# Patient Record
Sex: FEMALE | URBAN - METROPOLITAN AREA
[De-identification: names, ages, dates, MRNs, and addresses within clinical notes are randomized per-mention and may not be internally consistent; named-entity substitution may affect disease eponyms.]

---

## 2020-12-15 ENCOUNTER — APPOINTMENT (RX ONLY)
Dept: URBAN - METROPOLITAN AREA CLINIC 303 | Facility: CLINIC | Age: 45
Setting detail: DERMATOLOGY
End: 2020-12-15

## 2020-12-15 VITALS — TEMPERATURE: 97.4 F

## 2020-12-15 DIAGNOSIS — Z41.9 ENCOUNTER FOR PROCEDURE FOR PURPOSES OTHER THAN REMEDYING HEALTH STATE, UNSPECIFIED: ICD-10-CM

## 2020-12-15 PROCEDURE — ? OTHER

## 2020-12-15 PROCEDURE — ? BOTOX

## 2020-12-15 ASSESSMENT — LOCATION SIMPLE DESCRIPTION DERM
LOCATION SIMPLE: LEFT FOREHEAD
LOCATION SIMPLE: LEFT EYEBROW
LOCATION SIMPLE: GLABELLA
LOCATION SIMPLE: RIGHT FOREHEAD

## 2020-12-15 ASSESSMENT — LOCATION DETAILED DESCRIPTION DERM
LOCATION DETAILED: RIGHT INFERIOR FOREHEAD
LOCATION DETAILED: LEFT INFERIOR FOREHEAD
LOCATION DETAILED: GLABELLA
LOCATION DETAILED: LEFT MEDIAL EYEBROW
LOCATION DETAILED: RIGHT INFERIOR MEDIAL FOREHEAD

## 2020-12-15 ASSESSMENT — LOCATION ZONE DERM: LOCATION ZONE: FACE

## 2020-12-15 NOTE — PROCEDURE: OTHER
Other (Free Text): Pt had Botox injections one month ago in [place] done on her foreahead and periocular areas only. She thinks she spent about $250. She did not have any injections in the glabellar region so it freely moves and her forehead is not completely flat and slightly moving asymmetrically. I discussed she would benefit from Botox into the glabellar area. She wants to be conservative with cost so we will start out with only 10 units in this area but she may need more. Given already had botox on forehead I am agreeable to try a lower number of units. Follow up to add if needed. Also she asked about the “sinking look” under her eyes. I discussed with her it’s not appropriate to use Botox on the medial lower eyelid area where she has lost fat pads and should consider fillers for filling mid face. REfer to LUDIN Other (Free Text): Pt had Botox injections one month ago in [place] done on her foreahead and periocular areas only. She thinks she spent about $250. She did not have any injections in the glabellar region so it freely moves and her forehead is not completely flat and slightly moving asymmetrically. I discussed she would benefit from Botox into the glabellar area. She wants to be conservative with cost so we will start out with only 10 units in this area but she may need more. Given already had botox on forehead I am agreeable to try a lower number of units. Follow up to add if needed. Also she asked about the “sinking look” under her eyes. I discussed with her it’s not appropriate to use Botox on the medial lower eyelid area where she has lost fat pads and should consider fillers for filling mid face. REfer to LUDIN

## 2020-12-15 NOTE — PROCEDURE: BOTOX
Right Pupillary Line Units: 0
Expiration Date (Month Year): 09/2023
Show Additional Area 2: Yes
Show Mentalis Units: No
Detail Level: Detailed
Price (Use Numbers Only, No Special Characters Or $): 100
Dilution (U/0.1 Cc): 4
Post-Care Instructions: Patient instructed to not lie down for 4 hours and limit physical activity for 24 hours.
Consent: Written consent obtained. Risks include but not limited to lid/brow ptosis, bruising, swelling, diplopia, temporary effect, incomplete chemical denervation.
Reconstitution Date (Optional): 12/8/2020
Glabellar Complex Units: 10

## 2021-01-19 ENCOUNTER — APPOINTMENT (RX ONLY)
Dept: URBAN - METROPOLITAN AREA CLINIC 303 | Facility: CLINIC | Age: 46
Setting detail: DERMATOLOGY
End: 2021-01-19

## 2021-01-19 VITALS — TEMPERATURE: 96.9 F

## 2021-01-19 DIAGNOSIS — Z41.9 ENCOUNTER FOR PROCEDURE FOR PURPOSES OTHER THAN REMEDYING HEALTH STATE, UNSPECIFIED: ICD-10-CM

## 2021-01-19 PROCEDURE — ? BOTOX

## 2021-01-19 PROCEDURE — ? OTHER

## 2021-01-19 ASSESSMENT — LOCATION SIMPLE DESCRIPTION DERM
LOCATION SIMPLE: LEFT FOREHEAD
LOCATION SIMPLE: RIGHT FOREHEAD

## 2021-01-19 ASSESSMENT — LOCATION DETAILED DESCRIPTION DERM
LOCATION DETAILED: LEFT FOREHEAD
LOCATION DETAILED: RIGHT LATERAL FOREHEAD
LOCATION DETAILED: RIGHT SUPERIOR FOREHEAD
LOCATION DETAILED: RIGHT FOREHEAD
LOCATION DETAILED: LEFT SUPERIOR FOREHEAD

## 2021-01-19 ASSESSMENT — LOCATION ZONE DERM: LOCATION ZONE: FACE

## 2021-01-19 NOTE — PROCEDURE: BOTOX
Right Pupillary Line Units: 0
Expiration Date (Month Year): 09/2023
Show Additional Area 2: Yes
Show Mentalis Units: No
Detail Level: Detailed
Price (Use Numbers Only, No Special Characters Or $): 90
Dilution (U/0.1 Cc): 4
Post-Care Instructions: Patient instructed to not lie down for 4 hours and limit physical activity for 24 hours.
Consent: Written consent obtained. Risks include but not limited to lid/brow ptosis, bruising, swelling, diplopia, temporary effect, incomplete chemical denervation.
Forehead Units: 10
Reconstitution Date (Optional): 12/8/2020

## 2021-01-19 NOTE — PROCEDURE: OTHER
Note Text (......Xxx Chief Complaint.): This diagnosis correlates with the
Render Risk Assessment In Note?: no
Detail Level: Detailed
Other (Free Text): Pt on promotional in building rate per manager

## 2021-02-09 ENCOUNTER — APPOINTMENT (RX ONLY)
Dept: URBAN - METROPOLITAN AREA CLINIC 303 | Facility: CLINIC | Age: 46
Setting detail: DERMATOLOGY
End: 2021-02-09

## 2021-02-09 VITALS — TEMPERATURE: 97 F

## 2021-02-09 DIAGNOSIS — Z41.9 ENCOUNTER FOR PROCEDURE FOR PURPOSES OTHER THAN REMEDYING HEALTH STATE, UNSPECIFIED: ICD-10-CM

## 2021-02-09 PROCEDURE — ? BOTOX

## 2021-02-09 PROCEDURE — ? OTHER

## 2021-02-09 PROCEDURE — ? FULL BODY SKIN EXAM - DECLINED

## 2021-02-09 ASSESSMENT — LOCATION SIMPLE DESCRIPTION DERM
LOCATION SIMPLE: RIGHT EYEBROW
LOCATION SIMPLE: LEFT CHEEK
LOCATION SIMPLE: LEFT EYELID
LOCATION SIMPLE: LEFT FOREHEAD
LOCATION SIMPLE: GLABELLA
LOCATION SIMPLE: RIGHT TEMPLE
LOCATION SIMPLE: RIGHT CHEEK

## 2021-02-09 ASSESSMENT — LOCATION DETAILED DESCRIPTION DERM
LOCATION DETAILED: GLABELLA
LOCATION DETAILED: LEFT SUPERIOR CENTRAL MALAR CHEEK
LOCATION DETAILED: LEFT INFERIOR MEDIAL FOREHEAD
LOCATION DETAILED: RIGHT SUPERIOR LATERAL MALAR CHEEK
LOCATION DETAILED: LEFT LATERAL CANTHUS
LOCATION DETAILED: RIGHT CENTRAL EYEBROW
LOCATION DETAILED: RIGHT INFERIOR TEMPLE

## 2021-02-09 ASSESSMENT — LOCATION ZONE DERM
LOCATION ZONE: FACE
LOCATION ZONE: EYELID

## 2021-02-09 NOTE — PROCEDURE: OTHER
Detail Level: Detailed
Render Risk Assessment In Note?: no
Note Text (......Xxx Chief Complaint.): This diagnosis correlates with the
Other (Free Text): Jessie wants to do minimal tmt today on glabella and periorbital. Trying to keep cost to under 120 today. So we did 2 units on each site and may need to add more in 1-2 weeks

## 2021-02-22 ENCOUNTER — APPOINTMENT (RX ONLY)
Dept: URBAN - METROPOLITAN AREA CLINIC 303 | Facility: CLINIC | Age: 46
Setting detail: DERMATOLOGY
End: 2021-02-22

## 2021-02-22 VITALS — TEMPERATURE: 97.2 F

## 2021-02-22 DIAGNOSIS — Z41.9 ENCOUNTER FOR PROCEDURE FOR PURPOSES OTHER THAN REMEDYING HEALTH STATE, UNSPECIFIED: ICD-10-CM | Status: RESOLVING

## 2021-02-22 PROCEDURE — ? BOTOX

## 2021-02-22 PROCEDURE — ? FULL BODY SKIN EXAM - DECLINED

## 2021-02-22 ASSESSMENT — LOCATION DETAILED DESCRIPTION DERM
LOCATION DETAILED: LEFT CENTRAL EYEBROW
LOCATION DETAILED: RIGHT FOREHEAD
LOCATION DETAILED: GLABELLA
LOCATION DETAILED: RIGHT CENTRAL EYEBROW
LOCATION DETAILED: LEFT FOREHEAD
LOCATION DETAILED: LEFT INFERIOR MEDIAL FOREHEAD

## 2021-02-22 ASSESSMENT — LOCATION SIMPLE DESCRIPTION DERM
LOCATION SIMPLE: LEFT EYEBROW
LOCATION SIMPLE: LEFT FOREHEAD
LOCATION SIMPLE: GLABELLA
LOCATION SIMPLE: RIGHT FOREHEAD
LOCATION SIMPLE: RIGHT EYEBROW

## 2021-02-22 ASSESSMENT — LOCATION ZONE DERM: LOCATION ZONE: FACE

## 2021-02-25 ENCOUNTER — APPOINTMENT (RX ONLY)
Dept: URBAN - METROPOLITAN AREA CLINIC 303 | Facility: CLINIC | Age: 46
Setting detail: DERMATOLOGY
End: 2021-02-25

## 2021-02-25 VITALS — TEMPERATURE: 97 F

## 2021-02-25 DIAGNOSIS — Z41.9 ENCOUNTER FOR PROCEDURE FOR PURPOSES OTHER THAN REMEDYING HEALTH STATE, UNSPECIFIED: ICD-10-CM

## 2021-02-25 PROCEDURE — ? FULL BODY SKIN EXAM - DECLINED

## 2021-02-25 PROCEDURE — ? OTHER

## 2021-02-25 PROCEDURE — ? BOTOX

## 2021-02-25 ASSESSMENT — LOCATION DETAILED DESCRIPTION DERM
LOCATION DETAILED: RIGHT FOREHEAD
LOCATION DETAILED: RIGHT MEDIAL FOREHEAD

## 2021-02-25 ASSESSMENT — LOCATION ZONE DERM: LOCATION ZONE: FACE

## 2021-02-25 ASSESSMENT — LOCATION SIMPLE DESCRIPTION DERM: LOCATION SIMPLE: RIGHT FOREHEAD

## 2021-02-25 NOTE — PROCEDURE: BOTOX
Price (Use Numbers Only, No Special Characters Or $): 33 Price (Use Numbers Only, No Special Characters Or $): 13

## 2021-04-21 ENCOUNTER — APPOINTMENT (RX ONLY)
Dept: URBAN - METROPOLITAN AREA CLINIC 303 | Facility: CLINIC | Age: 46
Setting detail: DERMATOLOGY
End: 2021-04-21

## 2021-04-21 VITALS — TEMPERATURE: 97.2 F

## 2021-04-21 DIAGNOSIS — Z41.9 ENCOUNTER FOR PROCEDURE FOR PURPOSES OTHER THAN REMEDYING HEALTH STATE, UNSPECIFIED: ICD-10-CM

## 2021-04-21 PROCEDURE — ? FULL BODY SKIN EXAM - DECLINED

## 2021-04-21 PROCEDURE — ? BOTOX

## 2021-04-21 ASSESSMENT — LOCATION DETAILED DESCRIPTION DERM
LOCATION DETAILED: RIGHT MEDIAL FOREHEAD
LOCATION DETAILED: RIGHT FOREHEAD
LOCATION DETAILED: RIGHT MID TEMPLE
LOCATION DETAILED: LEFT MID TEMPLE
LOCATION DETAILED: LEFT FOREHEAD
LOCATION DETAILED: LEFT LATERAL FOREHEAD
LOCATION DETAILED: GLABELLA

## 2021-04-21 ASSESSMENT — LOCATION SIMPLE DESCRIPTION DERM
LOCATION SIMPLE: LEFT TEMPLE
LOCATION SIMPLE: LEFT FOREHEAD
LOCATION SIMPLE: GLABELLA
LOCATION SIMPLE: RIGHT FOREHEAD
LOCATION SIMPLE: RIGHT TEMPLE

## 2021-04-21 ASSESSMENT — LOCATION ZONE DERM: LOCATION ZONE: FACE

## 2021-05-24 ENCOUNTER — APPOINTMENT (RX ONLY)
Dept: URBAN - METROPOLITAN AREA CLINIC 303 | Facility: CLINIC | Age: 46
Setting detail: DERMATOLOGY
End: 2021-05-24

## 2021-05-24 DIAGNOSIS — Z02.9 ENCOUNTER FOR ADMINISTRATIVE EXAMINATIONS, UNSPECIFIED: ICD-10-CM

## 2021-07-12 ENCOUNTER — APPOINTMENT (RX ONLY)
Dept: URBAN - METROPOLITAN AREA CLINIC 303 | Facility: CLINIC | Age: 46
Setting detail: DERMATOLOGY
End: 2021-07-12

## 2021-07-12 DIAGNOSIS — Z41.9 ENCOUNTER FOR PROCEDURE FOR PURPOSES OTHER THAN REMEDYING HEALTH STATE, UNSPECIFIED: ICD-10-CM

## 2021-07-12 PROCEDURE — ? BOTOX

## 2021-07-12 PROCEDURE — ? SCREENING FOR COVID-19

## 2021-07-12 PROCEDURE — ? FULL BODY SKIN EXAM - DECLINED

## 2021-07-12 ASSESSMENT — LOCATION DETAILED DESCRIPTION DERM
LOCATION DETAILED: LEFT FOREHEAD
LOCATION DETAILED: LEFT MID TEMPLE
LOCATION DETAILED: RIGHT FOREHEAD
LOCATION DETAILED: RIGHT MID TEMPLE
LOCATION DETAILED: RIGHT MEDIAL FOREHEAD
LOCATION DETAILED: LEFT LATERAL FOREHEAD

## 2021-07-12 ASSESSMENT — LOCATION SIMPLE DESCRIPTION DERM
LOCATION SIMPLE: LEFT FOREHEAD
LOCATION SIMPLE: RIGHT FOREHEAD
LOCATION SIMPLE: LEFT TEMPLE
LOCATION SIMPLE: RIGHT TEMPLE

## 2021-07-12 ASSESSMENT — LOCATION ZONE DERM: LOCATION ZONE: FACE

## 2021-07-12 NOTE — PROCEDURE: SCREENING FOR COVID-19
Previous Infection Text: The patient has recovered from a previous COVID-19 infection.
Detail Level: Simple
Has The Patient Been Infected With Covid-19 In The Past?: Yes

## 2021-08-02 ENCOUNTER — APPOINTMENT (RX ONLY)
Dept: URBAN - METROPOLITAN AREA CLINIC 303 | Facility: CLINIC | Age: 46
Setting detail: DERMATOLOGY
End: 2021-08-02

## 2021-08-02 DIAGNOSIS — Z02.9 ENCOUNTER FOR ADMINISTRATIVE EXAMINATIONS, UNSPECIFIED: ICD-10-CM

## 2021-10-12 ENCOUNTER — APPOINTMENT (RX ONLY)
Dept: URBAN - METROPOLITAN AREA CLINIC 303 | Facility: CLINIC | Age: 46
Setting detail: DERMATOLOGY
End: 2021-10-12

## 2021-10-12 DIAGNOSIS — Z41.9 ENCOUNTER FOR PROCEDURE FOR PURPOSES OTHER THAN REMEDYING HEALTH STATE, UNSPECIFIED: ICD-10-CM

## 2021-10-12 PROCEDURE — ? FULL BODY SKIN EXAM - DECLINED

## 2021-10-12 PROCEDURE — ? SCREENING FOR COVID-19

## 2021-10-12 PROCEDURE — ? BOTOX

## 2021-10-12 ASSESSMENT — LOCATION DETAILED DESCRIPTION DERM
LOCATION DETAILED: LEFT MID TEMPLE
LOCATION DETAILED: RIGHT FOREHEAD
LOCATION DETAILED: RIGHT MEDIAL FOREHEAD
LOCATION DETAILED: RIGHT MID TEMPLE
LOCATION DETAILED: LEFT FOREHEAD
LOCATION DETAILED: LEFT LATERAL FOREHEAD

## 2021-10-12 ASSESSMENT — LOCATION SIMPLE DESCRIPTION DERM
LOCATION SIMPLE: RIGHT TEMPLE
LOCATION SIMPLE: RIGHT FOREHEAD
LOCATION SIMPLE: LEFT TEMPLE
LOCATION SIMPLE: LEFT FOREHEAD

## 2021-10-12 ASSESSMENT — LOCATION ZONE DERM: LOCATION ZONE: FACE

## 2022-01-19 ENCOUNTER — APPOINTMENT (RX ONLY)
Dept: URBAN - METROPOLITAN AREA CLINIC 303 | Facility: CLINIC | Age: 47
Setting detail: DERMATOLOGY
End: 2022-01-19

## 2022-01-19 DIAGNOSIS — Z41.9 ENCOUNTER FOR PROCEDURE FOR PURPOSES OTHER THAN REMEDYING HEALTH STATE, UNSPECIFIED: ICD-10-CM

## 2022-01-19 PROCEDURE — ? SCREENING FOR COVID-19

## 2022-01-19 PROCEDURE — ? OTHER

## 2022-01-19 PROCEDURE — ? BOTOX

## 2022-01-19 PROCEDURE — ? FULL BODY SKIN EXAM - DECLINED

## 2022-01-19 NOTE — PROCEDURE: OTHER
Detail Level: Detailed
Other (Free Text): Pt requests $150 and so 15 units used across forehead and periorbital
Render Risk Assessment In Note?: no
Note Text (......Xxx Chief Complaint.): This diagnosis correlates with the

## 2022-01-19 NOTE — PROCEDURE: SCREENING FOR COVID-19
Previous Infection Text: The patient has recovered from a previous COVID-19 infection.
Detail Level: Simple
Has The Patient Been Infected With Covid-19 In The Past?: Yes
Has The Patient Been Vaccinated For Covid-19?: No

## 2022-04-13 ENCOUNTER — APPOINTMENT (RX ONLY)
Dept: URBAN - METROPOLITAN AREA CLINIC 303 | Facility: CLINIC | Age: 47
Setting detail: DERMATOLOGY
End: 2022-04-13

## 2022-04-13 DIAGNOSIS — Z41.9 ENCOUNTER FOR PROCEDURE FOR PURPOSES OTHER THAN REMEDYING HEALTH STATE, UNSPECIFIED: ICD-10-CM

## 2022-04-13 PROCEDURE — ? FULL BODY SKIN EXAM - DECLINED

## 2022-04-13 PROCEDURE — ? BOTOX

## 2022-04-13 PROCEDURE — ? SCREENING FOR COVID-19

## 2022-04-13 PROCEDURE — ? OTHER

## 2022-04-13 ASSESSMENT — LOCATION SIMPLE DESCRIPTION DERM
LOCATION SIMPLE: INFERIOR FOREHEAD
LOCATION SIMPLE: RIGHT FOREHEAD
LOCATION SIMPLE: LEFT EYEBROW
LOCATION SIMPLE: LEFT TEMPLE
LOCATION SIMPLE: RIGHT TEMPLE
LOCATION SIMPLE: LEFT FOREHEAD

## 2022-04-13 ASSESSMENT — LOCATION DETAILED DESCRIPTION DERM
LOCATION DETAILED: LEFT LATERAL FOREHEAD
LOCATION DETAILED: LEFT INFERIOR TEMPLE
LOCATION DETAILED: RIGHT INFERIOR TEMPLE
LOCATION DETAILED: LEFT FOREHEAD
LOCATION DETAILED: LEFT MEDIAL EYEBROW
LOCATION DETAILED: RIGHT FOREHEAD
LOCATION DETAILED: RIGHT MEDIAL FOREHEAD
LOCATION DETAILED: INFERIOR MID FOREHEAD

## 2022-04-13 ASSESSMENT — LOCATION ZONE DERM: LOCATION ZONE: FACE

## 2022-08-30 ENCOUNTER — APPOINTMENT (RX ONLY)
Dept: URBAN - METROPOLITAN AREA CLINIC 303 | Facility: CLINIC | Age: 47
Setting detail: DERMATOLOGY
End: 2022-08-30

## 2022-08-30 DIAGNOSIS — Z41.9 ENCOUNTER FOR PROCEDURE FOR PURPOSES OTHER THAN REMEDYING HEALTH STATE, UNSPECIFIED: ICD-10-CM

## 2022-08-30 PROCEDURE — ? FULL BODY SKIN EXAM - DECLINED

## 2022-08-30 PROCEDURE — ? SCREENING FOR COVID-19

## 2022-08-30 PROCEDURE — ? OTHER

## 2022-08-30 PROCEDURE — ? BOTOX

## 2022-08-30 ASSESSMENT — LOCATION DETAILED DESCRIPTION DERM
LOCATION DETAILED: RIGHT MEDIAL FOREHEAD
LOCATION DETAILED: LEFT INFERIOR TEMPLE
LOCATION DETAILED: RIGHT INFERIOR TEMPLE
LOCATION DETAILED: LEFT LATERAL FOREHEAD
LOCATION DETAILED: INFERIOR MID FOREHEAD
LOCATION DETAILED: RIGHT FOREHEAD
LOCATION DETAILED: LEFT MEDIAL EYEBROW
LOCATION DETAILED: LEFT FOREHEAD

## 2022-08-30 ASSESSMENT — LOCATION SIMPLE DESCRIPTION DERM
LOCATION SIMPLE: INFERIOR FOREHEAD
LOCATION SIMPLE: LEFT TEMPLE
LOCATION SIMPLE: RIGHT TEMPLE
LOCATION SIMPLE: LEFT FOREHEAD
LOCATION SIMPLE: LEFT EYEBROW
LOCATION SIMPLE: RIGHT FOREHEAD

## 2022-08-30 ASSESSMENT — LOCATION ZONE DERM: LOCATION ZONE: FACE

## 2022-08-30 NOTE — PROCEDURE: OTHER
Detail Level: Detailed
Other (Free Text): Pt requests $120 and so 12 units used across forehead
Render Risk Assessment In Note?: no
Note Text (......Xxx Chief Complaint.): This diagnosis correlates with the

## 2023-02-08 ENCOUNTER — APPOINTMENT (RX ONLY)
Dept: URBAN - METROPOLITAN AREA CLINIC 303 | Facility: CLINIC | Age: 48
Setting detail: DERMATOLOGY
End: 2023-02-08

## 2023-02-08 DIAGNOSIS — Z41.9 ENCOUNTER FOR PROCEDURE FOR PURPOSES OTHER THAN REMEDYING HEALTH STATE, UNSPECIFIED: ICD-10-CM

## 2023-02-08 PROCEDURE — ? BOTOX

## 2023-02-08 ASSESSMENT — LOCATION DETAILED DESCRIPTION DERM
LOCATION DETAILED: RIGHT FOREHEAD
LOCATION DETAILED: RIGHT INFERIOR LATERAL FOREHEAD
LOCATION DETAILED: RIGHT INFERIOR FOREHEAD
LOCATION DETAILED: LEFT SUPERIOR FOREHEAD
LOCATION DETAILED: RIGHT MID TEMPLE
LOCATION DETAILED: LEFT INFERIOR LATERAL FOREHEAD
LOCATION DETAILED: LEFT MID TEMPLE
LOCATION DETAILED: LEFT INFERIOR FOREHEAD

## 2023-02-08 ASSESSMENT — LOCATION ZONE DERM: LOCATION ZONE: FACE

## 2023-02-08 ASSESSMENT — LOCATION SIMPLE DESCRIPTION DERM
LOCATION SIMPLE: LEFT TEMPLE
LOCATION SIMPLE: RIGHT FOREHEAD
LOCATION SIMPLE: RIGHT TEMPLE
LOCATION SIMPLE: LEFT FOREHEAD